# Patient Record
Sex: MALE | Race: WHITE
[De-identification: names, ages, dates, MRNs, and addresses within clinical notes are randomized per-mention and may not be internally consistent; named-entity substitution may affect disease eponyms.]

---

## 2018-06-21 ENCOUNTER — HOSPITAL ENCOUNTER (EMERGENCY)
Dept: HOSPITAL 80 - FED | Age: 48
Discharge: HOME | End: 2018-06-21
Payer: MEDICAID

## 2018-06-21 VITALS — SYSTOLIC BLOOD PRESSURE: 102 MMHG | DIASTOLIC BLOOD PRESSURE: 57 MMHG
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
## 2018-06-21 DIAGNOSIS — B88.9: ICD-10-CM

## 2018-06-21 DIAGNOSIS — S89.92XA: Primary | ICD-10-CM

## 2018-06-21 DIAGNOSIS — F17.200: ICD-10-CM
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
## 2018-06-21 NOTE — EDPHY
H & P


Stated Complaint: bi-lat knee pain


Time Seen by Provider: 06/21/18 14:19


HPI/ROS: 





CHIEF COMPLAINT:  Left knee pain post falling out of wheelchair





HISTORY OF PRESENT ILLNESS:  48-year-old male history of multiple sclerosis, 

wheelchair bound, arrives with his  complaining of left knee pain 

which occurred 2 days ago when he was transferring on the toilet and his foot 

caught and he landed on his left knee.  This is a mechanical episode non 

syncope.  He notes notes an abrasion with no other wound.  No head injury.  No 

midline C-spine pain or injury.  No back pain or injury.  No chest pain or 

injury.  No straddle injury.  No fever or chills.  No flu-like symptoms.  No 

malaise.  No prolonged periods of mobility on the floor.





Patient also states that he has bed bugs.  These are not causing him discomfort.





PRIMARY CARE PROVIDER: [facility] 





REVIEW OF SYSTEMS:


A ten point review of systems was performed and is negative with the exception 

of the items mentioned in the HPI








PAST MEDICAL & SURGICAL  HISTORY:  multiple sclerosis    





SOCIAL HISTORY: Lives by himself.    











************


PHYSICAL EXAM





(Prior to examination, patient consented to physical exam, hands were washed 

and my usual and customary physical exam procedures followed)


1) GENERAL: thin, alert and oriented.  Appears to be in no acute distress.  

Wheelchair


2) HEAD: Normocephalic, atraumatic


3) HEENT: Pupils equal, round, reactive to light bilaterally.  Sclera 

anicteric. 


4) NECK: Full range of motion, no meningeal signs.


5) LUNGS: Clear auscultation bilaterally, no wheezes, no rhonchi, no 

retractions.   


6) HEART: Regular rate and rhythm, no murmur, no heave, no gallop.


7) ABDOMEN: No guarding, no rebound, no focal tenderness, negative McBurney's, 

negative Mcintyre's, negative Rovsing's, negative peritoneal sign,


8) MUSCULOSKELETAL: Left lower extremity:  Abrasion and tenderness to the left 

lateral knee.  Normal color and temperature.  No evidence of septic arthritis.  

Moving all extremities, no focal areas of tenderness, no obvious trauma.  No 

peripheral edema or discoloration.


9) BACK: No CVA tenderness, no midline vertebral tenderness, no fluctuance, no 

step-off, no obvious trauma, no visual or palpable abnormality. 


10) SKIN: multiple flat erythematous skin lesions which are non excoriated.  No 

signs of super infection or cellulitis.. 


11) Psychiatric:  Patient is oriented X 3, there is no agitation.








***************





DIFFERENTIAL DIAGNOSIS:   In no particular order including but not limited to 

fracture, sprain, strain, dislocation, bedbugs, scabies








- Personal History


Current Tetanus/Diphtheria Vaccine: Unsure


Current Tetanus Diphtheria and Acellular Pertussis (TDAP): Unsure


Tetanus Vaccine Date: within the last 10 yrs





- Medical/Surgical History


Hx Asthma: No


Hx Chronic Respiratory Disease: No


Hx Diabetes: No


Hx Cardiac Disease: No


Hx Renal Disease: No


Hx Cirrhosis: No


Hx Alcoholism: No


Hx HIV/AIDS: No


Hx Splenectomy or Spleen Trauma: No


Other PMH: hip issues ms blind self caths





- Social History


Smoking Status: Current every day smoker


Constitutional: 


 Initial Vital Signs











Temperature (C)  36.8 C   06/21/18 13:51


 


Heart Rate  130 H  06/21/18 13:51


 


Respiratory Rate  16   06/21/18 13:51


 


Blood Pressure  118/67   06/21/18 13:51


 


O2 Sat (%)  93   06/21/18 13:51








 











O2 Delivery Mode               Room Air














Allergies/Adverse Reactions: 


 





penicillin V potassium [From Pen-Vee K] Allergy (Verified 10/31/14 13:15)


 


opiates Allergy (Uncoded 10/31/14 13:15)


 


pen Allergy (Uncoded 10/31/14 13:15)


 








Home Medications: 














 Medication  Instructions  Recorded


 


Herbals/Supplements -Info Only 1 ea PO DAILY 10/31/14


 


Acetaminophen [Tylenol 325mg (*)] 650 mg PO Q4 PRN #0 tab 11/03/14


 


Cyclobenzaprine [Flexeril 10 MG 10 mg PO TID #20 tab 11/03/14





(*)]  


 


Ibuprofen [Motrin (*)] 800 mg PO Q8 PRN #30 tab 11/03/14


 


Oxybutynin Chloride [Ditropan] 5 mg PO DAILY #30 tab 11/03/14














Medical Decision Making





- Diagnostics


Imaging Results: 


 Imaging Impressions





Knee X-Ray  06/21/18 14:30


Impression:  Negative. No acute fracture or effusion.








Images review myself


ED Course/Re-evaluation: 





2:30 p.m.:  Patient is in the ER with this .  He would only like an 

x-ray of his left knee.  He does not want further intervention.  He is noted to 

be thin.  S He is noted to be tachycardic and afebrile.  The specific etiology 

of his tachycardia is incompletely clear.  I recommended further evaluation 

from the ER.  tates that he feels comfortable living by himself and he would 

like to be discharged home. He is answering questions appropriately.  I believe 

him to have decision-making capacity.





3:16 p.m.:  Emergency department staff has obtained a bug the patient which may 

be consistent with bed bug.  The patient I discussed retic a shin bedbugs 

recommend he contact an .  Regarding his left knee pain there is no 

evidence of fracture.  We discussed limitations of x-ray informed that non 

osseous injury not ruled out.  Doubt septic arthritis.  There are no prolonged 

periods of mobility on the floor.  Doubt rhabdomyolysis.  Plan will be 

discharge home, follow up with Orthopedics.  Usual and customary orthopedic 

precautions and instructions provided.





Departure





- Departure


Disposition: Home, Routine, Self-Care


Clinical Impression: 


 Infestation by bed bug





Left knee pain


Qualifiers:


 Chronicity: acute Qualified Code(s): M25.562 - Pain in left knee





Condition: Good


Instructions:  Knee Pain (ED), Insect Bite or Sting (ED), Bed Bugs (ED)


Additional Instructions: 


Return to the ER immediately if you experience discoloration, have worsening 

pain, numbness, tingling, or any other symptoms that concern you.  If you 

received x-rays in the emergency department today, be advised, that ligamentous

, tendon, muscular, and other non-bony injury cannot be fully ruled out. Try to 

keep your affected extremity elevated above the level of your chest, and keep 

cold packs on the affected area, for the next 48 hours.  Please do not itch or 

scratch your skin you can cause a skin infection.


Referrals: 


Macho Solares MD [Medical Doctor] - 2-3 days, call for appt.